# Patient Record
(demographics unavailable — no encounter records)

---

## 2025-02-12 NOTE — HISTORY OF PRESENT ILLNESS
[FreeTextEntry1] : DIANE ELLSWORTH is a 55 year old female with a history of HLD  She is presenting today as a referral by Dr. Fierro for evaluation of bile duct dilation She has a history of epigastric discomfort and bloating with a borderline elevated lipase She underwent CT and then MRI in Nov/Dec 2024 that revealed a hepatic hemangioma, hepatic steatosis, mild extrahepatic bililary dilatation of 8mm without obstructing lesions or filling defects, a normal GB, and a pancreas with a 4mm HOP cyst and 2mm TOP cyst with otherwise normal parenchyma.  She also had an EGD that showed a small gastric ulcer with oozing along the posterior gastric body s/p clipping Path with mild peptic duodenitis Reactive gastropathy negative for HP  She states she has been on PPI and Pepcid for this abdominal discomfort/bloating with no effect

## 2025-02-12 NOTE — REASON FOR VISIT
[Home] : at home, [unfilled] , at the time of the visit. [Medical Office: ([facility])___] : at the medical office located in  [Telehealth (audio & video)] : This visit was provided via telehealth using real-time 2-way audio visual technology. [Verbal consent obtained from patient] : the patient, [unfilled] [Initial Evaluation] : an initial evaluation

## 2025-02-12 NOTE — ASSESSMENT
[FreeTextEntry1] : DIAEN ELLSWORTH is a 55 year old female with a history of HLD  #8mm bile duct, no filling defects - slightly above ULN for age, GB present/normal #Tiny pancreatic cysts (4mm and 2mm)- possible side branch IPMN #Hepatic steatosis #Hepatic hemangioma #Epigastric abd discomfort/bloating  Recs: - discussed workup for biliary dilation includes EUS to eval for microlithiasis, ampullary lesion - also would repeat EGD to eval for ulcer healing - Benefits and risks of the procedure (including but not limited to pain, infection, bleeding, perforation, injury to internal organs, missed lesions, IV site problems, risks of anesthesia, possible need for further procedures including emergency surgery) were explained to the patient. Alternatives to the procedure were discussed. The patient was agreeable to proceed. - no PST needed  - recommended further followup with Dr. Fierro to discuss possible symptomatic treatment for bloating/discomfort such as empiric SIBO treatment, advised so far to date nothing very bad found (negative imaging and no significant endoscopic abnormalities) - will send in script for bentyl PRN